# Patient Record
(demographics unavailable — no encounter records)

---

## 2024-11-19 NOTE — PHYSICAL EXAM
[No Acute Distress] : no acute distress [Normal Oropharynx] : normal oropharynx [Normal Appearance] : normal appearance [No Neck Mass] : no neck mass [Normal Rate/Rhythm] : normal rate/rhythm [Normal S1, S2] : normal s1, s2 [No Murmurs] : no murmurs [No Resp Distress] : no resp distress [Rhonchi] : rhonchi [No Abnormalities] : no abnormalities [Benign] : benign [Normal Gait] : normal gait [No Clubbing] : no clubbing [No Cyanosis] : no cyanosis [No Edema] : no edema [FROM] : FROM [Normal Color/ Pigmentation] : normal color/ pigmentation [No Focal Deficits] : no focal deficits [Oriented x3] : oriented x3 [Normal Affect] : normal affect [TextBox_11] : parotid fullness/tenderness LEFT

## 2024-11-19 NOTE — HISTORY OF PRESENT ILLNESS
[Former] : former [Never] : never [TextBox_4] : Patient is a 81-year-old male with past medical history significant for diabetes mellitus, hypertension, high cholesterol, former smoker , Atrial fibrillation

## 2024-11-19 NOTE — ASSESSMENT
[FreeTextEntry1] : 82-year-old male with past medical history significant for asthma/COPD, former smoker atrial fibrillation on amiodarone but not anticoagulation, hypertension, high cholesterol, GERD, CHF who presents today for follow-up.  Reports being in overall baseline health.   On exam, lungs are clear, no wheezing or rhonchi, in NAD. LEFT parotid fullness/tenderness/  Prescription renewal performed including continuing Lasix 80mg, Rx for Augmentin 500mg for 7 days as treatment for Parotiditis.  Follow-up in 6 months.

## 2025-05-11 NOTE — HISTORY OF PRESENT ILLNESS
[FreeTextEntry1] : SHUBHAM CAMPBELL Feb 23 1942   Language: English Date of First visit: 05/06/2025 Accompanied by: wife Yehuda Contact info: Referring Provider/PCP: Dr. Ramirez Fax: [phone]   CC/ Problem List: swollen left testicle =============================================================================== FIRST VISIT / Summary: Very pleasant 83 year old M here for swollen left testicle x 4 days. Presented at Urgent and provided urine sample where UCx <10k. He was given 7 day course of Bactrim. Took 2/7 and not helpful. Harder to touch not painful unless applies firm touch. Had 1 episode like this 3-4 years ago he had stone and passed independently. No issues since. No changes in his urination    ------------------------------------------------------------------------------------------- INTERVAL VISITS:   ===============================================================================   PMH: HTN, HLD  FHx: no  malignancies SocHx: former smoker 1ppd x40 years quit 2015, no Etoh,    PSH: TURP   ROS: Review of Systems is as per HPI unless otherwise denoted below   =============================================================================== DATA: LABS (SELECTED):---------------------------------------------------------------------------------------------------  1/3/2024: PSA 1.9 05/06/2025 UCx and G/C negative   RADS:-------------------------------------------------------------------------------------------------------------------     PATHOLOGY/CYTOLOGY:-------------------------------------------------------------------------------------------     VOIDING STUDIES: ----------------------------------------------------------------------------------------------------     STONE STUDIES: (Analysis/LLSA)----------------------------------------------------------------------------------     PROCEDURES: -----------------------------------------------------------------------------------------------       =============================================================================== PHYSICAL EXAM:    FOCUSED: ----------------------------------------------------------------------------------------------------------------  mild phimosis edema to left    ======================================================================================= DISCUSSION: ======================================================================================= ASSESSMENT and PLAN   1. Left swollen testicle -UCx and G/C -prescribed 10 day course of Augmentin, SE explained -advised 5 days of Advil  -scrotal US and renal US ?stones history -f/u in 1 month   ======================================================================================= The total time personally spent preparing for this visit (reviewing test results, obtaining external history) and during the visit (ordering tests/medications, spent face to face with the patient / family and counseling them on the above), as well as after the visit (on clinical documentation and coordination with other care providers) was approximately 41 minutes in addition to any procedures.   Thank you for allowing me to assist in the care of your patient. Should you have any questions please do not hesitate to reach out to me.     Dixon Kruse MD                                                       [facility] Physician [facility]   [place] Office: [address], [address] [place] [address] T: [phone] F: [phone]   [place] Office: [address] [place] [address] T: [phone] F: [phone]

## 2025-05-11 NOTE — HISTORY OF PRESENT ILLNESS
[FreeTextEntry1] : SHUBHAM CAMPBELL Feb 23 1942   Language: English Date of First visit: 05/06/2025 Accompanied by: wife Yehuda Contact info: Referring Provider/PCP: Dr. Ramirez Fax: [phone]   CC/ Problem List: swollen left testicle =============================================================================== FIRST VISIT / Summary: Very pleasant 83 year old M here for swollen left testicle x 4 days. Presented at Urgent and provided urine sample where UCx <10k. He was given 7 day course of Bactrim. Took 2/7 and not helpful. Harder to touch not painful unless applies firm touch. Had 1 episode like this 3-4 years ago he had stone and passed independently. No issues since. No changes in his urination    ------------------------------------------------------------------------------------------- INTERVAL VISITS:   ===============================================================================   PMH: HTN, HLD  FHx: no  malignancies SocHx: former smoker 1ppd x40 years quit 2015, no Etoh,    PSH: TURP   ROS: Review of Systems is as per HPI unless otherwise denoted below   =============================================================================== DATA: LABS (SELECTED):---------------------------------------------------------------------------------------------------  1/3/2024: PSA 1.9 05/06/2025 UCx and G/C negative   RADS:-------------------------------------------------------------------------------------------------------------------     PATHOLOGY/CYTOLOGY:-------------------------------------------------------------------------------------------     VOIDING STUDIES: ----------------------------------------------------------------------------------------------------     STONE STUDIES: (Analysis/LLSA)----------------------------------------------------------------------------------     PROCEDURES: -----------------------------------------------------------------------------------------------       =============================================================================== PHYSICAL EXAM:    FOCUSED: ----------------------------------------------------------------------------------------------------------------  mild phimosis edema to left    ======================================================================================= DISCUSSION: ======================================================================================= ASSESSMENT and PLAN   1. Left swollen testicle -UCx and G/C -prescribed 10 day course of Augmentin, SE explained -advised 5 days of Advil  -scrotal US and renal US ?stones history -f/u in 1 month   ======================================================================================= The total time personally spent preparing for this visit (reviewing test results, obtaining external history) and during the visit (ordering tests/medications, spent face to face with the patient / family and counseling them on the above), as well as after the visit (on clinical documentation and coordination with other care providers) was approximately 41 minutes in addition to any procedures.   Thank you for allowing me to assist in the care of your patient. Should you have any questions please do not hesitate to reach out to me.     Dixon Kruse MD                                                       [facility] Physician [facility] for Urology   [place] Office: [address], [address] [place] [address] T: [phone] F: [phone]   [place] Office: [address] [place] [address] T: [phone] F: [phone]

## 2025-06-13 NOTE — ASSESSMENT
[FreeTextEntry1] : 82-year-old male with past medical history significant for asthma/COPD, former smoker atrial fibrillation on amiodarone, hypertension, high cholesterol, GERD, CHF who presents today for follow-up.  Reports being in overall baseline health. has intermittent dizziness.  stopped AC on his own, following with Dr. Abreu for his AFIB, has appt next week.  On exam, lungs are clear, no wheezing or rhonchi, in NAD.  Prescription renewal performed Discussed at length the need for patient to continue with  as his PCP and need for close follow-up with Cardiology.  Follow-up in 6 months.